# Patient Record
Sex: FEMALE | Race: WHITE | NOT HISPANIC OR LATINO | Employment: OTHER | URBAN - METROPOLITAN AREA
[De-identification: names, ages, dates, MRNs, and addresses within clinical notes are randomized per-mention and may not be internally consistent; named-entity substitution may affect disease eponyms.]

---

## 2017-01-12 ENCOUNTER — TELEPHONE (OUTPATIENT)
Dept: INTERNAL MEDICINE | Facility: CLINIC | Age: 72
End: 2017-01-12

## 2017-01-12 DIAGNOSIS — M54.5 LOW BACK PAIN, UNSPECIFIED BACK PAIN LATERALITY, UNSPECIFIED CHRONICITY, WITH SCIATICA PRESENCE UNSPECIFIED: Primary | ICD-10-CM

## 2017-01-12 NOTE — TELEPHONE ENCOUNTER
----- Message from Cindy Brandt sent at 1/12/2017 11:06 AM CST -----  Contact: self [phone]  Pt needs a referral for an outside [facility]/ [facility] and they need the referral and clinic notes from pt's last three visits. Pt is experiencing low back pain and is unable to stand up straight, she is bending over more and more.       Fax [phone]/  / [facility]

## 2017-09-11 RX ORDER — VALACYCLOVIR HYDROCHLORIDE 500 MG/1
TABLET, FILM COATED ORAL
Qty: 30 TABLET | Refills: 3 | Status: SHIPPED | OUTPATIENT
Start: 2017-09-11 | End: 2017-09-12 | Stop reason: SDUPTHER

## 2017-09-12 RX ORDER — VALACYCLOVIR HYDROCHLORIDE 500 MG/1
TABLET, FILM COATED ORAL
Qty: 30 TABLET | Refills: 3 | Status: SHIPPED | OUTPATIENT
Start: 2017-09-12

## 2017-11-29 ENCOUNTER — TELEPHONE (OUTPATIENT)
Dept: INTERNAL MEDICINE | Facility: CLINIC | Age: 72
End: 2017-11-29

## 2017-11-29 DIAGNOSIS — Z12.31 ENCOUNTER FOR SCREENING MAMMOGRAM FOR BREAST CANCER: Primary | ICD-10-CM

## 2017-11-29 RX ORDER — SIMVASTATIN 10 MG/1
10 TABLET, FILM COATED ORAL DAILY
Qty: 90 TABLET | Refills: 3 | Status: SHIPPED | OUTPATIENT
Start: 2017-11-29 | End: 2017-11-30

## 2017-11-29 NOTE — TELEPHONE ENCOUNTER
----- Message from Gila Woodruff sent at 11/29/2017 11:34 AM CST -----  Contact: patient- [phone]  Patient would like to get a referral.  Does the patient already have the specialty clinic appointment scheduled:  no  If yes, what date is the appointment scheduled:     Referral to what specialty:  mammo  Reason (be specific):  annual  Does the patient want the referral with a specific physician:  no  Is this an [facility]:    Comments:

## 2017-11-29 NOTE — TELEPHONE ENCOUNTER
"----- Message from Gila Woodruff sent at 11/29/2017 11:32 AM CST -----  Contact: patient- [phone]  RX request - refill or new RX.  Is this a refill or new RX:  refill  RX name and strength: simvastatin (ZOCOR) 10 MG tablet  Directions:   Is this a 30 day or 90 day RX:  90 day supply  Pharmacy name and phone # (DON'T enter "on file" or "in chart"):  [facility] [phone] (Phone)  [phone] (Fax)  Comments:          "

## 2017-11-30 RX ORDER — SIMVASTATIN 10 MG/1
TABLET, FILM COATED ORAL
Qty: 90 TABLET | Refills: 0 | Status: SHIPPED | OUTPATIENT
Start: 2017-11-30 | End: 2018-05-02 | Stop reason: SDUPTHER

## 2018-01-03 DIAGNOSIS — I10 ESSENTIAL HYPERTENSION: Chronic | ICD-10-CM

## 2018-01-03 RX ORDER — LOSARTAN POTASSIUM AND HYDROCHLOROTHIAZIDE 12.5; 5 MG/1; MG/1
TABLET ORAL
Qty: 90 TABLET | Refills: 1 | Status: SHIPPED | OUTPATIENT
Start: 2018-01-03 | End: 2018-05-25

## 2018-05-03 ENCOUNTER — TELEPHONE (OUTPATIENT)
Dept: INTERNAL MEDICINE | Facility: CLINIC | Age: 73
End: 2018-05-03

## 2018-05-03 DIAGNOSIS — E03.9 HYPOTHYROIDISM, UNSPECIFIED TYPE: ICD-10-CM

## 2018-05-03 DIAGNOSIS — E55.9 VITAMIN D DEFICIENCY DISEASE: ICD-10-CM

## 2018-05-03 DIAGNOSIS — E78.5 HYPERLIPIDEMIA, UNSPECIFIED HYPERLIPIDEMIA TYPE: ICD-10-CM

## 2018-05-03 DIAGNOSIS — I10 HYPERTENSION, UNSPECIFIED TYPE: Primary | ICD-10-CM

## 2018-05-03 RX ORDER — LEVOTHYROXINE SODIUM 50 UG/1
TABLET ORAL
Qty: 90 TABLET | Refills: 0 | Status: SHIPPED | OUTPATIENT
Start: 2018-05-03 | End: 2018-05-25

## 2018-05-03 RX ORDER — SIMVASTATIN 10 MG/1
TABLET, FILM COATED ORAL
Qty: 90 TABLET | Refills: 0 | Status: SHIPPED | OUTPATIENT
Start: 2018-05-03 | End: 2018-05-25 | Stop reason: SDUPTHER

## 2018-05-03 RX ORDER — LEVOTHYROXINE SODIUM 200 UG/1
TABLET ORAL
Qty: 90 TABLET | Refills: 0 | Status: SHIPPED | OUTPATIENT
Start: 2018-05-03 | End: 2018-05-25

## 2018-05-18 ENCOUNTER — LAB VISIT (OUTPATIENT)
Dept: LAB | Facility: HOSPITAL | Age: 73
End: 2018-05-18
Attending: INTERNAL MEDICINE
Payer: MEDICARE

## 2018-05-18 DIAGNOSIS — E03.9 HYPOTHYROIDISM, UNSPECIFIED TYPE: ICD-10-CM

## 2018-05-18 DIAGNOSIS — E55.9 VITAMIN D DEFICIENCY DISEASE: ICD-10-CM

## 2018-05-18 DIAGNOSIS — I10 HYPERTENSION, UNSPECIFIED TYPE: ICD-10-CM

## 2018-05-18 DIAGNOSIS — E78.5 HYPERLIPIDEMIA, UNSPECIFIED HYPERLIPIDEMIA TYPE: ICD-10-CM

## 2018-05-18 LAB
25(OH)D3+25(OH)D2 SERPL-MCNC: 24 NG/ML
ALBUMIN SERPL BCP-MCNC: 3.7 G/DL
ALP SERPL-CCNC: 93 U/L
ALT SERPL W/O P-5'-P-CCNC: 24 U/L
ANION GAP SERPL CALC-SCNC: 9 MMOL/L
AST SERPL-CCNC: 26 U/L
BASOPHILS # BLD AUTO: 0.12 K/UL
BASOPHILS NFR BLD: 1.4 %
BILIRUB SERPL-MCNC: 0.5 MG/DL
BUN SERPL-MCNC: 13 MG/DL
CALCIUM SERPL-MCNC: 9.6 MG/DL
CHLORIDE SERPL-SCNC: 105 MMOL/L
CHOLEST SERPL-MCNC: 173 MG/DL
CHOLEST/HDLC SERPL: 3.3 {RATIO}
CO2 SERPL-SCNC: 26 MMOL/L
CREAT SERPL-MCNC: 0.9 MG/DL
DIFFERENTIAL METHOD: NORMAL
EOSINOPHIL # BLD AUTO: 0.5 K/UL
EOSINOPHIL NFR BLD: 5.4 %
ERYTHROCYTE [DISTWIDTH] IN BLOOD BY AUTOMATED COUNT: 13.4 %
EST. GFR  (AFRICAN AMERICAN): >60 ML/MIN/1.73 M^2
EST. GFR  (NON AFRICAN AMERICAN): >60 ML/MIN/1.73 M^2
GLUCOSE SERPL-MCNC: 97 MG/DL
HCT VFR BLD AUTO: 45 %
HDLC SERPL-MCNC: 53 MG/DL
HDLC SERPL: 30.6 %
HGB BLD-MCNC: 14.5 G/DL
IMM GRANULOCYTES # BLD AUTO: 0.03 K/UL
IMM GRANULOCYTES NFR BLD AUTO: 0.4 %
LDLC SERPL CALC-MCNC: 93.8 MG/DL
LYMPHOCYTES # BLD AUTO: 2.3 K/UL
LYMPHOCYTES NFR BLD: 27.4 %
MCH RBC QN AUTO: 30.7 PG
MCHC RBC AUTO-ENTMCNC: 32.2 G/DL
MCV RBC AUTO: 95 FL
MONOCYTES # BLD AUTO: 0.9 K/UL
MONOCYTES NFR BLD: 11.1 %
NEUTROPHILS # BLD AUTO: 4.6 K/UL
NEUTROPHILS NFR BLD: 54.3 %
NONHDLC SERPL-MCNC: 120 MG/DL
NRBC BLD-RTO: 0 /100 WBC
PLATELET # BLD AUTO: 325 K/UL
PMV BLD AUTO: 9.9 FL
POTASSIUM SERPL-SCNC: 3.6 MMOL/L
PROT SERPL-MCNC: 7.6 G/DL
RBC # BLD AUTO: 4.72 M/UL
SODIUM SERPL-SCNC: 140 MMOL/L
T4 FREE SERPL-MCNC: 0.72 NG/DL
TRIGL SERPL-MCNC: 131 MG/DL
TSH SERPL DL<=0.005 MIU/L-ACNC: 29.05 UIU/ML
WBC # BLD AUTO: 8.39 K/UL

## 2018-05-18 PROCEDURE — 82306 VITAMIN D 25 HYDROXY: CPT

## 2018-05-18 PROCEDURE — 85025 COMPLETE CBC W/AUTO DIFF WBC: CPT

## 2018-05-18 PROCEDURE — 84439 ASSAY OF FREE THYROXINE: CPT

## 2018-05-18 PROCEDURE — 80053 COMPREHEN METABOLIC PANEL: CPT

## 2018-05-18 PROCEDURE — 36415 COLL VENOUS BLD VENIPUNCTURE: CPT | Mod: PO

## 2018-05-18 PROCEDURE — 80061 LIPID PANEL: CPT

## 2018-05-18 PROCEDURE — 84443 ASSAY THYROID STIM HORMONE: CPT

## 2018-05-25 ENCOUNTER — HOSPITAL ENCOUNTER (OUTPATIENT)
Dept: RADIOLOGY | Facility: HOSPITAL | Age: 73
Discharge: HOME | End: 2018-05-25
Attending: INTERNAL MEDICINE
Payer: MEDICARE

## 2018-05-25 ENCOUNTER — OFFICE VISIT (OUTPATIENT)
Dept: INTERNAL MEDICINE | Facility: CLINIC | Age: 73
End: 2018-05-25
Payer: MEDICARE

## 2018-05-25 ENCOUNTER — TELEPHONE (OUTPATIENT)
Dept: INTERNAL MEDICINE | Facility: CLINIC | Age: 73
End: 2018-05-25

## 2018-05-25 VITALS
HEART RATE: 77 BPM | TEMPERATURE: 98 F | HEIGHT: 62 IN | DIASTOLIC BLOOD PRESSURE: 73 MMHG | BODY MASS INDEX: 53.92 KG/M2 | WEIGHT: 293 LBS | OXYGEN SATURATION: 95 % | SYSTOLIC BLOOD PRESSURE: 138 MMHG

## 2018-05-25 DIAGNOSIS — E03.4 HYPOTHYROIDISM DUE TO ACQUIRED ATROPHY OF THYROID: Chronic | ICD-10-CM

## 2018-05-25 DIAGNOSIS — M85.80 OSTEOPENIA, UNSPECIFIED LOCATION: ICD-10-CM

## 2018-05-25 DIAGNOSIS — N39.41 URGE INCONTINENCE OF URINE: ICD-10-CM

## 2018-05-25 DIAGNOSIS — Z12.31 ENCOUNTER FOR SCREENING MAMMOGRAM FOR BREAST CANCER: ICD-10-CM

## 2018-05-25 DIAGNOSIS — E03.9 HYPOTHYROIDISM, UNSPECIFIED TYPE: Primary | ICD-10-CM

## 2018-05-25 DIAGNOSIS — E78.5 HYPERLIPIDEMIA, UNSPECIFIED HYPERLIPIDEMIA TYPE: Chronic | ICD-10-CM

## 2018-05-25 DIAGNOSIS — E66.01 MORBID OBESITY WITH BMI OF 50.0-59.9, ADULT: ICD-10-CM

## 2018-05-25 DIAGNOSIS — I10 ESSENTIAL HYPERTENSION: Primary | Chronic | ICD-10-CM

## 2018-05-25 DIAGNOSIS — M17.0 PRIMARY OSTEOARTHRITIS OF BOTH KNEES: ICD-10-CM

## 2018-05-25 PROCEDURE — 99215 OFFICE O/P EST HI 40 MIN: CPT | Mod: S$PBB,,, | Performed by: INTERNAL MEDICINE

## 2018-05-25 PROCEDURE — 99213 OFFICE O/P EST LOW 20 MIN: CPT | Mod: PBBFAC,PO | Performed by: INTERNAL MEDICINE

## 2018-05-25 PROCEDURE — 99999 PR PBB SHADOW E&M-EST. PATIENT-LVL III: CPT | Mod: PBBFAC,,, | Performed by: INTERNAL MEDICINE

## 2018-05-25 PROCEDURE — 77067 SCR MAMMO BI INCL CAD: CPT | Mod: TC,PO

## 2018-05-25 PROCEDURE — 77067 SCR MAMMO BI INCL CAD: CPT | Mod: 26,,, | Performed by: RADIOLOGY

## 2018-05-25 RX ORDER — LOSARTAN POTASSIUM AND HYDROCHLOROTHIAZIDE 12.5; 1 MG/1; MG/1
1 TABLET ORAL DAILY
Qty: 90 TABLET | Refills: 3 | Status: SHIPPED | OUTPATIENT
Start: 2018-05-25 | End: 2018-05-25 | Stop reason: SDUPTHER

## 2018-05-25 RX ORDER — SIMVASTATIN 10 MG/1
10 TABLET, FILM COATED ORAL DAILY
Qty: 90 TABLET | Refills: 3 | Status: SHIPPED | OUTPATIENT
Start: 2018-05-25 | End: 2019-09-13 | Stop reason: SDUPTHER

## 2018-05-25 RX ORDER — LEVOTHYROXINE SODIUM 300 UG/1
300 TABLET ORAL
Qty: 30 TABLET | Refills: 1 | Status: SHIPPED | OUTPATIENT
Start: 2018-05-25 | End: 2018-08-03 | Stop reason: SDUPTHER

## 2018-05-25 RX ORDER — LOSARTAN POTASSIUM AND HYDROCHLOROTHIAZIDE 12.5; 1 MG/1; MG/1
1 TABLET ORAL DAILY
Qty: 90 TABLET | Refills: 3 | Status: SHIPPED | OUTPATIENT
Start: 2018-05-25 | End: 2019-05-25

## 2018-05-25 RX ORDER — FESOTERODINE FUMARATE 8 MG/1
TABLET, EXTENDED RELEASE ORAL
Qty: 90 TABLET | Refills: 3 | Status: SHIPPED | OUTPATIENT
Start: 2018-05-25

## 2018-05-25 NOTE — PROGRESS NOTES
Subjective:       Patient ID: Ariana Bond is a 72 y.o. female.    Chief Complaint: Annual Exam and Medication Refill    HPI   72 y.o. Female here for annual exam.      Cholesterol: (normal)  Vaccines: Influenza (declined); Tetanus (2015); Prevnar (2016); Zoster (2016)  Eye exam: 10/16  Mammogram: 10/16  Gyn exam: 2015  Colonoscopy: around 2011- await records  DEXA: 7/15     Exercise: water 2x/wk  Diet: regular     Past Medical History:    HTN (hypertension)                                            Hyperlipidemia                                                Hypothyroid                                                   Morbid obesity with BMI of 50.0-59.9, adult                   Osteoarthritis, knee                                          Osteopenia                                                    Vitamin D deficiency disease                                Past Surgical History:    TOTAL KNEE ARTHROPLASTY                                          Comment:Left    HYSTERECTOMY                                                 Social History    Marital status:              Spouse name:                       Years of education:                 Number of children: 2              Occupational History  Occupation          Employer            Comment               Retired                                      Social History Main Topics    Smoking status: Former Smoker                                                                Packs/day: 0.00      Years: 0.00           Smokeless status: Not on file                       Comment: quit 35 years ago    Alcohol use: Yes                Comment: rarely has a drink/ special ocasion    Drug use: No              Sexual activity: Yes               Partners with: Male      -- Lisinopril -- Other (See Comments)    --  cough  Ms. Bond does not currently have medications on file.  Review of Systems   Constitutional: Negative for activity change, appetite change, chills,  diaphoresis, fatigue, fever and unexpected weight change.   HENT: Negative for congestion, mouth sores, postnasal drip, rhinorrhea, sinus pressure, sneezing, sore throat, trouble swallowing and voice change.    Eyes: Negative for pain, discharge and visual disturbance.   Respiratory: Negative for cough, shortness of breath and wheezing.    Cardiovascular: Negative for chest pain, palpitations and leg swelling.   Gastrointestinal: Negative for abdominal pain, blood in stool, constipation, diarrhea, nausea and vomiting.   Endocrine: Negative for cold intolerance and heat intolerance.   Genitourinary: Negative for difficulty urinating, dysuria, frequency, hematuria and urgency.   Musculoskeletal: Positive for arthralgias. Negative for myalgias.   Skin: Negative for rash and wound.   Allergic/Immunologic: Negative for environmental allergies and food allergies.   Neurological: Positive for weakness. Negative for dizziness, tremors, seizures, syncope, light-headedness and headaches.   Hematological: Negative for adenopathy. Does not bruise/bleed easily.   Psychiatric/Behavioral: Negative for confusion and sleep disturbance. The patient is not nervous/anxious.        Objective:      Physical Exam   Constitutional: She is oriented to person, place, and time. She appears well-developed and well-nourished. No distress.   HENT:   Head: Normocephalic and atraumatic.   Right Ear: External ear normal.   Left Ear: External ear normal.   Nose: Nose normal.   Mouth/Throat: Oropharynx is clear and moist. No oropharyngeal exudate.   Eyes: Conjunctivae and EOM are normal. Pupils are equal, round, and reactive to light. Right eye exhibits no discharge. Left eye exhibits no discharge. No scleral icterus.   Neck: Neck supple. No JVD present. No thyromegaly present.   Cardiovascular: Normal rate, regular rhythm, normal heart sounds and intact distal pulses.    No murmur heard.  Pulmonary/Chest: Effort normal and breath sounds normal. No  respiratory distress. She has no wheezes. She has no rales. She exhibits no tenderness.   Abdominal: Soft. Bowel sounds are normal. She exhibits no distension. There is no tenderness. There is no guarding.   Musculoskeletal: She exhibits no edema.   Lymphadenopathy:     She has no cervical adenopathy.   Neurological: She is alert and oriented to person, place, and time. No cranial nerve deficit. Coordination normal.   Skin: Skin is warm and dry. No rash noted. She is not diaphoretic. No pallor.   Psychiatric: She has a normal mood and affect. Judgment normal.   Nursing note and vitals reviewed.      Assessment:       1. Essential hypertension    2. Hypothyroidism due to acquired atrophy of thyroid    3. Hyperlipidemia, unspecified hyperlipidemia type    4. Osteopenia, unspecified location    5. Primary osteoarthritis of both knees    6. Morbid obesity with BMI of 50.0-59.9, adult    7. Urge incontinence of urine    8. Encounter for screening mammogram for breast cancer        Plan:    1. HTN- increase Hyzaar to 100-12.5 mg daily   2. Hypothyroidism- not well controlled on Synthroid 250 mcg daily       Repeat levels today and if still off will increase dose    3. HLD- controlled on Zocor 10 mg daily   4. Osteopenia- stable per DEXA(7/15)- repeat next year        Continue Calcium/Vitamin D daily    5. OA of B/L knees- stable on Mobic 15 mg daily PRN       S/P L TKA   6. Morbid Obesity- worsening, pt advised on proper diet/exercise for weight loss       Pt did not f/u with Bariatric Medicine   7. Urge incontinence- restart Toviaz 8 mg daily    8. Mammogram ordered   9. Annual exam- labs reviewed with pt       Vaccines: Influenza (declined); Tetanus (2015); Prevnar (2016); Zoster (2016)       Eye exam: 10/16       Mammogram: 10/16       Gyn exam: 2015       Colonoscopy: around 2011- await records       DEXA: 7/15  10. F/u in 6 months         Over 1/2 of 40 minute visit spent reviewing pt's medical records,  education/discussion of pt's medical conditions and medical management

## 2018-05-28 NOTE — TELEPHONE ENCOUNTER
----- Message from Grupo Marin DO sent at 5/25/2018  1:36 PM CDT -----  Thyroid function remains off- I will increase dose of Synthroid, repeat TSH/FT4 in 6 weeks

## 2018-07-09 ENCOUNTER — LAB VISIT (OUTPATIENT)
Dept: LAB | Facility: HOSPITAL | Age: 73
End: 2018-07-09
Attending: INTERNAL MEDICINE
Payer: MEDICARE

## 2018-07-09 DIAGNOSIS — E03.9 HYPOTHYROIDISM, UNSPECIFIED TYPE: ICD-10-CM

## 2018-07-09 LAB
T4 FREE SERPL-MCNC: 1.36 NG/DL
TSH SERPL DL<=0.005 MIU/L-ACNC: 0.16 UIU/ML

## 2018-07-09 PROCEDURE — 36415 COLL VENOUS BLD VENIPUNCTURE: CPT | Mod: PO

## 2018-07-09 PROCEDURE — 84443 ASSAY THYROID STIM HORMONE: CPT

## 2018-07-09 PROCEDURE — 84439 ASSAY OF FREE THYROXINE: CPT

## 2018-08-02 ENCOUNTER — TELEPHONE (OUTPATIENT)
Dept: INTERNAL MEDICINE | Facility: CLINIC | Age: 73
End: 2018-08-02

## 2018-08-02 NOTE — TELEPHONE ENCOUNTER
"----- Message from Danna Muñoz sent at 8/2/2018 11:58 AM CDT -----  Contact: SELF/ [phone]  RX request - refill or new RX.REFILL  Is this a refill or new RX:    RX name and strength: levothyroxine (SYNTHROID) 300 MCG Tab  Directions:   Is this a 30 day or 90 day RX:  90  Local pharmacy or mail order pharmacy:    Pharmacy name and phone # (DON'T enter "on file" or "in chart"): [facility] - [place], [place] - [address] AT Portal to Registered [facility] Sites [phone] (Phone)  [phone] (Fax)      Comments:        "

## 2018-08-03 RX ORDER — LEVOTHYROXINE SODIUM 300 UG/1
300 TABLET ORAL
Qty: 90 TABLET | Refills: 3 | Status: SHIPPED | OUTPATIENT
Start: 2018-08-03 | End: 2019-09-30 | Stop reason: SDUPTHER

## 2019-09-13 ENCOUNTER — TELEPHONE (OUTPATIENT)
Dept: INTERNAL MEDICINE | Facility: CLINIC | Age: 74
End: 2019-09-13

## 2019-09-13 RX ORDER — SIMVASTATIN 10 MG/1
10 TABLET, FILM COATED ORAL DAILY
Qty: 90 TABLET | Refills: 3 | Status: SHIPPED | OUTPATIENT
Start: 2019-09-13 | End: 2019-09-30 | Stop reason: SDUPTHER

## 2019-09-13 NOTE — TELEPHONE ENCOUNTER
----- Message from Roxana Toth sent at 9/13/2019 10:35 AM CDT -----  Contact: Margarette/ Elias [phone]  Is this a refill or new RX:  Refill  RX name and strength: simvastatin (ZOCOR) 10 MG tablet and levothyroxine (SYNTHROID) 300 MCG Tab     Pharmacy name and phone # [facility] - [place], [place] - [address] AT Portal to [facility] Sites [phone] (Phone)  [phone] (Fax)

## 2019-09-30 ENCOUNTER — TELEPHONE (OUTPATIENT)
Dept: INTERNAL MEDICINE | Facility: CLINIC | Age: 74
End: 2019-09-30

## 2019-09-30 RX ORDER — LEVOTHYROXINE SODIUM 300 UG/1
300 TABLET ORAL
Qty: 90 TABLET | Refills: 3 | Status: SHIPPED | OUTPATIENT
Start: 2019-09-30 | End: 2020-09-29

## 2019-09-30 RX ORDER — SIMVASTATIN 10 MG/1
10 TABLET, FILM COATED ORAL DAILY
Qty: 90 TABLET | Refills: 3 | Status: SHIPPED | OUTPATIENT
Start: 2019-09-30

## 2019-09-30 NOTE — TELEPHONE ENCOUNTER
----- Message from Le Landry sent at 9/30/2019 12:00 PM CDT -----  Contact: self [phone]  Type: Rx    Name of medication(s):  levothyroxine (SYNTHROID) 300 MCG Tab    Is this a refill? New rx? refill    Who prescribed medication? Tomas    Pharmacy Name, Phone, & Location:  [facility]pharmacy #[id number] - [place], [place] - [address]    Comments: patient recently broke her shoulder and states is very hard for her to get to the clinic, she has an apt on 10/7. Please call her back and advise    Thank you

## 2019-09-30 NOTE — TELEPHONE ENCOUNTER
----- Message from Roxana Toth sent at 9/30/2019 11:33 AM CDT -----  Contact: Pt [phone]  Is this a refill or new RX:  Refill    RX name and strength: simvastatin (ZOCOR) 10 MG tablet and levothyroxine (SYNTHROID) 300 MCG Tab     Pharmacy name and phone # [facility] - [place], [place] - [facility] [address] AT Portal to Registered [facility] Sites [phone] (Phone)  [phone] (Fax)

## 2020-01-08 ENCOUNTER — TELEPHONE (OUTPATIENT)
Dept: INTERNAL MEDICINE | Facility: CLINIC | Age: 75
End: 2020-01-08

## 2020-01-08 NOTE — TELEPHONE ENCOUNTER
[facility] (patient has not been seen since 5-2018) faxed to [phone]

## 2021-02-18 ENCOUNTER — IMMUNIZATION (OUTPATIENT)
Dept: INTERNAL MEDICINE | Facility: CLINIC | Age: 76
End: 2021-02-18
Payer: MEDICARE

## 2021-02-18 DIAGNOSIS — Z23 NEED FOR VACCINATION: Primary | ICD-10-CM

## 2021-02-18 PROCEDURE — 91300 COVID-19, MRNA, LNP-S, PF, 30 MCG/0.3 ML DOSE VACCINE: CPT | Mod: PBBFAC | Performed by: FAMILY MEDICINE

## 2021-03-11 ENCOUNTER — IMMUNIZATION (OUTPATIENT)
Dept: INTERNAL MEDICINE | Facility: CLINIC | Age: 76
End: 2021-03-11
Payer: MEDICARE

## 2021-03-11 DIAGNOSIS — Z23 NEED FOR VACCINATION: Primary | ICD-10-CM

## 2021-03-11 PROCEDURE — 91300 COVID-19, MRNA, LNP-S, PF, 30 MCG/0.3 ML DOSE VACCINE: CPT | Mod: PBBFAC | Performed by: FAMILY MEDICINE

## 2021-03-11 PROCEDURE — 0002A COVID-19, MRNA, LNP-S, PF, 30 MCG/0.3 ML DOSE VACCINE: CPT | Mod: PBBFAC | Performed by: FAMILY MEDICINE

## 2022-12-27 NOTE — TELEPHONE ENCOUNTER
----- Message from Tess Trimble sent at 1/8/2020  9:34 AM CST -----  Contact: [facility]/[phone]  [facility] is requesting a call from the office regarding the patient's last office visit and clinical notes.    Please advise, thank you   Xelaugustinez Pregnancy And Lactation Text: This medication is Pregnancy Category D and is not considered safe during pregnancy.  The risk during breast feeding is also uncertain.